# Patient Record
Sex: MALE | Race: WHITE | Employment: FULL TIME | URBAN - METROPOLITAN AREA
[De-identification: names, ages, dates, MRNs, and addresses within clinical notes are randomized per-mention and may not be internally consistent; named-entity substitution may affect disease eponyms.]

---

## 2017-02-02 ENCOUNTER — HOSPITAL ENCOUNTER (OUTPATIENT)
Dept: OTHER | Age: 62
Discharge: HOME | End: 2017-02-02
Attending: FAMILY MEDICINE | Admitting: FAMILY MEDICINE

## 2017-02-02 DIAGNOSIS — M75.82 TENDINITIS OF LEFT ROTATOR CUFF: ICD-10-CM

## 2017-02-02 DIAGNOSIS — M25.512 ACUTE PAIN OF LEFT SHOULDER: ICD-10-CM

## 2018-01-17 ENCOUNTER — OFFICE VISIT (OUTPATIENT)
Dept: CARDIOLOGY CLINIC | Age: 63
End: 2018-01-17

## 2018-01-17 ENCOUNTER — NURSE ONLY (OUTPATIENT)
Dept: CARDIOLOGY CLINIC | Age: 63
End: 2018-01-17

## 2018-01-17 VITALS
HEIGHT: 72 IN | HEART RATE: 60 BPM | SYSTOLIC BLOOD PRESSURE: 124 MMHG | WEIGHT: 227.8 LBS | DIASTOLIC BLOOD PRESSURE: 78 MMHG | OXYGEN SATURATION: 97 % | BODY MASS INDEX: 30.85 KG/M2

## 2018-01-17 DIAGNOSIS — E78.49 OTHER HYPERLIPIDEMIA: ICD-10-CM

## 2018-01-17 DIAGNOSIS — R00.2 PALPITATION: ICD-10-CM

## 2018-01-17 DIAGNOSIS — I10 ESSENTIAL HYPERTENSION: ICD-10-CM

## 2018-01-17 DIAGNOSIS — R00.2 PALPITATION: Primary | ICD-10-CM

## 2018-01-17 DIAGNOSIS — G47.33 OSA (OBSTRUCTIVE SLEEP APNEA): ICD-10-CM

## 2018-01-17 PROCEDURE — 1036F TOBACCO NON-USER: CPT | Performed by: INTERNAL MEDICINE

## 2018-01-17 PROCEDURE — G8484 FLU IMMUNIZE NO ADMIN: HCPCS | Performed by: INTERNAL MEDICINE

## 2018-01-17 PROCEDURE — G8427 DOCREV CUR MEDS BY ELIG CLIN: HCPCS | Performed by: INTERNAL MEDICINE

## 2018-01-17 PROCEDURE — 93000 ELECTROCARDIOGRAM COMPLETE: CPT | Performed by: INTERNAL MEDICINE

## 2018-01-17 PROCEDURE — 3017F COLORECTAL CA SCREEN DOC REV: CPT | Performed by: INTERNAL MEDICINE

## 2018-01-17 PROCEDURE — G8417 CALC BMI ABV UP PARAM F/U: HCPCS | Performed by: INTERNAL MEDICINE

## 2018-01-17 PROCEDURE — 99204 OFFICE O/P NEW MOD 45 MIN: CPT | Performed by: INTERNAL MEDICINE

## 2018-01-17 RX ORDER — RANITIDINE 300 MG/1
300 TABLET ORAL NIGHTLY
COMMUNITY

## 2018-01-17 NOTE — PROGRESS NOTES
[address]   Electrophysiology Consultation   Date: 1/17/2018  Reason for Consultation:   Consult Requesting Physician: Alexus Giraldo      Chief Complaint   Patient presents with    Atrial Flutter        HPI: Kamari yTler is a 57 y.o. Seen by Jessee Bryson in 2012 for CP. 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 He is a  at [employer]. He is treated for hypertension, hyperlipidemia. Previously, he was taking a \"pill\" for palpitations but did not know the name of the medication. He has felt intermittent heart fluttering for two days and was referred to EP by . He arrives in office today for evaluation of fluttering sensation of heart. He states onset was years ago and very infrequent with affect on ADLs. The sensation reoccurred two days ago and has been persistent since then, still asymptomatic. Patient denies lightheadedness, dizziness, chest pain,  orthopnea, edema, presyncope or syncope. Past Medical History:   Diagnosis Date    Hypercholesteremia     Hypertension         History reviewed. No pertinent surgical history. Allergies: Allergies   Allergen Reactions    Penicillins Rash       Social History:   reports that he quit smoking about 38 years ago. He has never used smokeless tobacco. He reports that he drinks alcohol. He reports that he does not use drugs. Family History:  family history includes Heart Disease in his father and paternal uncle; High Blood Pressure in his father and paternal uncle; High Cholesterol in his father and paternal uncle. Reviewed. Denies family history of sudden cardiac death, arrhythmia, premature CAD    Review of System:  All other systems reviewed and are negative except for that noted above.  Pertinent negatives are:   General: negative for fever, chills   Ophthalmic ROS: negative for - eye pain or loss of vision  ENT ROS: negative for - headaches, sore throat   Respiratory: negative for - cough, sputum  Cardiovascular: Reviewed in HPI  Gastrointestinal: negative for - abdominal pain, diarrhea, N/V  Hematology: negative for - bleeding, blood clots, bruising or jaundice  Genito-Urinary:  negative for - Dysuria or incontinence  Musculoskeletal: negative for - Joint swelling, muscle pain  Neurological: negative for - confusion, dizziness, headaches   Psychiatric: No anxiety, no depression. Dermatological: negative for - rash    Physical Examination:  Vitals:    18 1458   BP: 124/78   Pulse: 60   SpO2: 97%      Wt Readings from Last 3 Encounters:   18 227 lb 12.8 oz (103.3 kg)   04/10/14 220 lb (99.8 kg)   12 224 lb 8 oz (101.8 kg)       · Constitutional: Oriented. No distress. · Head: Normocephalic and atraumatic. · Mouth/Throat: Oropharynx is clear and moist.   · Eyes: Conjunctivae normal. EOM are normal.   · Neck: Neck supple. No rigidity. No JVD present. · Cardiovascular: Normal rate, regular rhythm, S1&S2. · Pulmonary/Chest: Bilateral respiratory sounds. No wheezes, No rhonchi. · Abdominal: Soft. Bowel sounds present. No distension, No tenderness. · Musculoskeletal: No tenderness. No edema    · Lymphadenopathy: Has no cervical adenopathy. · Neurological: Alert and oriented. Cranial nerve appears intact, No Gross deficit   · Skin: Skin is warm and dry. No rash noted. · Psychiatric: Has a normal behavior       Labs, diagnostic and imaging results reviewed. Reviewed. EC2018   SB 55bpm Qtc 416    LHC: 12   Normal Coronary arteriogram   Normal LV gram       Medication:  Current Outpatient Prescriptions   Medication Sig Dispense Refill    ranitidine (ZANTAC) 300 MG tablet Take 300 mg by mouth nightly      hydrocortisone (PROCTOZONE-HC) 2.5 % rectal cream Place rectally as needed for Hemorrhoids Place rectally 2 times daily.  aspirin 81 MG tablet Take 81 mg by mouth daily.         lisinopril (PRINIVIL;ZESTRIL) 10 MG tablet Take 10 mg by mouth

## 2018-02-19 PROCEDURE — 93228 REMOTE 30 DAY ECG REV/REPORT: CPT | Performed by: INTERNAL MEDICINE

## 2018-02-20 ENCOUNTER — TELEPHONE (OUTPATIENT)
Dept: CARDIOLOGY CLINIC | Age: 63
End: 2018-02-20

## 2018-03-22 ENCOUNTER — OFFICE VISIT (OUTPATIENT)
Dept: CARDIOLOGY CLINIC | Age: 63
End: 2018-03-22

## 2018-03-22 VITALS
OXYGEN SATURATION: 96 % | DIASTOLIC BLOOD PRESSURE: 80 MMHG | HEIGHT: 72 IN | WEIGHT: 225 LBS | HEART RATE: 68 BPM | BODY MASS INDEX: 30.48 KG/M2 | SYSTOLIC BLOOD PRESSURE: 128 MMHG

## 2018-03-22 DIAGNOSIS — R00.2 PALPITATION: ICD-10-CM

## 2018-03-22 DIAGNOSIS — Z92.89 HISTORY OF CARDIAC MONITORING: ICD-10-CM

## 2018-03-22 DIAGNOSIS — E78.49 OTHER HYPERLIPIDEMIA: ICD-10-CM

## 2018-03-22 DIAGNOSIS — G47.33 OSA (OBSTRUCTIVE SLEEP APNEA): ICD-10-CM

## 2018-03-22 DIAGNOSIS — I47.29 NSVT (NONSUSTAINED VENTRICULAR TACHYCARDIA): Primary | ICD-10-CM

## 2018-03-22 DIAGNOSIS — I10 ESSENTIAL HYPERTENSION: ICD-10-CM

## 2018-03-22 PROCEDURE — 1036F TOBACCO NON-USER: CPT | Performed by: INTERNAL MEDICINE

## 2018-03-22 PROCEDURE — G8417 CALC BMI ABV UP PARAM F/U: HCPCS | Performed by: INTERNAL MEDICINE

## 2018-03-22 PROCEDURE — 3017F COLORECTAL CA SCREEN DOC REV: CPT | Performed by: INTERNAL MEDICINE

## 2018-03-22 PROCEDURE — 93000 ELECTROCARDIOGRAM COMPLETE: CPT | Performed by: INTERNAL MEDICINE

## 2018-03-22 PROCEDURE — 99214 OFFICE O/P EST MOD 30 MIN: CPT | Performed by: INTERNAL MEDICINE

## 2018-03-22 PROCEDURE — G8427 DOCREV CUR MEDS BY ELIG CLIN: HCPCS | Performed by: INTERNAL MEDICINE

## 2018-03-22 PROCEDURE — G8484 FLU IMMUNIZE NO ADMIN: HCPCS | Performed by: INTERNAL MEDICINE

## 2018-03-22 NOTE — PROGRESS NOTES
[facility]   Electrophysiology Consultation   Date: 3/22/2018  Reason for Consultation:   Consult Requesting Physician: Tigist Lozada      No chief complaint on file. HPI: Karthik Tucker is a 57 y.o. Seen by Chavo Sheets in 2012 for CP. 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 He is a  at [employer]. He is treated for hypertension, hyperlipidemia. Previously, he was taking a \"pill\" for palpitations but did not know the name of the medication. He has felt intermittent heart fluttering for two days and was referred to EP by . Interval History: Today Fili Chavez presents to office for follow up and results of 2 [facility] [address]. Feeling well overall from cardiac standpoint. No episodes since monitor, Discussed another monitor if palpitations return. He had some symptoms early on his monitor but no symptoms later when 2 short NSVT were seen    Past Medical History:   Diagnosis Date    Hypercholesteremia     Hypertension         History reviewed. No pertinent surgical history. Allergies: Allergies   Allergen Reactions    Penicillins Rash       Social History:   reports that he quit smoking about 38 years ago. He has never used smokeless tobacco. He reports that he drinks alcohol. He reports that he does not use drugs. Family History:  family history includes Heart Disease in his father and paternal uncle; High Blood Pressure in his father and paternal uncle; High Cholesterol in his father and paternal uncle. Reviewed. Denies family history of sudden cardiac death, arrhythmia, premature CAD    Review of System:  All other systems reviewed and are negative except for that noted above.  Pertinent negatives are:   General: negative for fever, chills   Ophthalmic ROS: negative for - eye pain or loss of vision  ENT ROS: negative for - headaches, sore throat   Respiratory: negative for - cough, sputum  Cardiovascular: Reviewed in HPI  Gastrointestinal: negative for - abdominal pain, diarrhea, N/V  Hematology: negative for - bleeding, blood clots, bruising or jaundice  Genito-Urinary:  negative for - Dysuria or incontinence  Musculoskeletal: negative for - Joint swelling, muscle pain  Neurological: negative for - confusion, dizziness, headaches   Psychiatric: No anxiety, no depression. Dermatological: negative for - rash    Physical Examination:  Vitals:    03/22/18 0836   BP: 128/80   Pulse: 68   SpO2: 96%      Wt Readings from Last 3 Encounters:   03/22/18 225 lb (102.1 kg)   01/17/18 227 lb 12.8 oz (103.3 kg)   04/10/14 220 lb (99.8 kg)       · Constitutional: Oriented. No distress. · Head: Normocephalic and atraumatic. · Mouth/Throat: Oropharynx is clear and moist.   · Eyes: Conjunctivae normal. EOM are normal.   · Neck: Neck supple. No rigidity. No JVD present. · Cardiovascular: Normal rate, regular rhythm, S1&S2. · Pulmonary/Chest: Bilateral respiratory sounds. No wheezes, No rhonchi. · Abdominal: Soft. Bowel sounds present. No distension, No tenderness. · Musculoskeletal: No tenderness. No edema    · Lymphadenopathy: Has no cervical adenopathy. · Neurological: Alert and oriented. Cranial nerve appears intact, No Gross deficit   · Skin: Skin is warm and dry. No rash noted. · Psychiatric: Has a normal behavior       Labs, diagnostic and imaging results reviewed. Reviewed. LABS:  Cr 1.02 3/14/2018  AST 23    MCOT 1/17/18-2/15/18 personally reviewed today  SR; HR  (61). 2 short NSVT one on 1/18 18 and one on 2/11/2018 6 beats of VT at 134bpm. No symptoms reported. No AF indicated; PVC burden <1%.      ECG:  3/22/2018 personally reviewed  SR 62bpm Qtc 413    LHC: 9/14/12   Normal Coronary arteriogram   Normal LV gram       Medication:  Current Outpatient Prescriptions   Medication Sig Dispense Refill    ranitidine (ZANTAC) 300 MG tablet Take 300 mg by mouth nightly      hydrocortisone (PROCTOZONE-HC) 2.5 % rectal cream Place

## 2020-07-29 ENCOUNTER — OFFICE VISIT (OUTPATIENT)
Dept: PRIMARY CARE CLINIC | Age: 65
End: 2020-07-29

## 2020-07-29 PROCEDURE — 99211 OFF/OP EST MAY X REQ PHY/QHP: CPT | Performed by: NURSE PRACTITIONER

## 2020-07-29 NOTE — PROGRESS NOTES
Brigitte Cottrell received a viral test for COVID-19. They were educated on isolation and quarantine as appropriate. For any symptoms, they were directed to seek care from their PCP, given contact information to establish with a doctor, directed to an urgent care or the emergency room.

## 2020-07-29 NOTE — PATIENT INSTRUCTIONS

## 2020-07-30 LAB
SARS-COV-2: NOT DETECTED
SOURCE: NORMAL

## 2021-12-22 ENCOUNTER — HOSPITAL ENCOUNTER (OUTPATIENT)
Age: 66
Discharge: HOME | End: 2021-12-22
Payer: MEDICARE

## 2021-12-22 PROCEDURE — U0003 INFECTIOUS AGENT DETECTION BY NUCLEIC ACID (DNA OR RNA); SEVERE ACUTE RESPIRATORY SYNDROME CORONAVIRUS 2 (SARS-COV-2) (CORONAVIRUS DISEASE [COVID-19]), AMPLIFIED PROBE TECHNIQUE, MAKING USE OF HIGH THROUGHPUT TECHNOLOGIES AS DESCRIBED BY CMS-2020-01-R: HCPCS

## 2021-12-22 PROCEDURE — U0005 INFEC AGEN DETEC AMPLI PROBE: HCPCS

## 2021-12-23 LAB — SARS-COV-2: NOT DETECTED

## 2021-12-27 ENCOUNTER — HOSPITAL ENCOUNTER (OUTPATIENT)
Dept: ENDOSCOPY | Age: 66
Discharge: HOME | End: 2021-12-27

## 2023-05-18 RX ORDER — FAMOTIDINE 40 MG/1
1 TABLET, FILM COATED ORAL DAILY
COMMUNITY
Start: 2022-10-04

## 2023-05-18 RX ORDER — PANTOPRAZOLE SODIUM 40 MG/1
1 TABLET, DELAYED RELEASE ORAL DAILY
COMMUNITY
Start: 2022-11-11

## 2023-05-22 ENCOUNTER — ANESTHESIA EVENT (OUTPATIENT)
Dept: ENDOSCOPY | Age: 68
End: 2023-05-22
Payer: MEDICARE

## 2023-05-23 ENCOUNTER — HOSPITAL ENCOUNTER (OUTPATIENT)
Age: 68
Setting detail: OUTPATIENT SURGERY
Discharge: HOME | End: 2023-05-23
Attending: INTERNAL MEDICINE | Admitting: INTERNAL MEDICINE
Payer: MEDICARE

## 2023-05-23 ENCOUNTER — ANESTHESIA (OUTPATIENT)
Dept: ENDOSCOPY | Age: 68
End: 2023-05-23
Payer: MEDICARE

## 2023-05-23 VITALS
HEART RATE: 57 BPM | HEIGHT: 72 IN | RESPIRATION RATE: 20 BRPM | TEMPERATURE: 97 F | DIASTOLIC BLOOD PRESSURE: 78 MMHG | SYSTOLIC BLOOD PRESSURE: 108 MMHG | WEIGHT: 218.48 LBS | BODY MASS INDEX: 29.59 KG/M2 | OXYGEN SATURATION: 98 %

## 2023-05-23 PROCEDURE — 2709999900 HC NON-CHARGEABLE SUPPLY: Performed by: INTERNAL MEDICINE

## 2023-05-23 PROCEDURE — 3604323500 HC GERD TEST W/ELECTRODE (BRAVO): Performed by: INTERNAL MEDICINE

## 2023-05-23 PROCEDURE — 7100000000 HC PACU RECOVERY - FIRST 15 MIN: Performed by: INTERNAL MEDICINE

## 2023-05-23 PROCEDURE — 2720000010 HC SURG SUPPLY STERILE: Performed by: INTERNAL MEDICINE

## 2023-05-23 PROCEDURE — 7100000010 HC PHASE II RECOVERY - FIRST 15 MIN: Performed by: INTERNAL MEDICINE

## 2023-05-23 PROCEDURE — 3700000000 HC ANESTHESIA ATTENDED CARE: Performed by: INTERNAL MEDICINE

## 2023-05-23 PROCEDURE — 3700000001 HC ADD 15 MINUTES (ANESTHESIA): Performed by: INTERNAL MEDICINE

## 2023-05-23 PROCEDURE — 2500000003 HC RX 250 WO HCPCS

## 2023-05-23 PROCEDURE — 6360000002 HC RX W HCPCS

## 2023-05-23 PROCEDURE — 2580000003 HC RX 258: Performed by: ANESTHESIOLOGY

## 2023-05-23 PROCEDURE — 7100000001 HC PACU RECOVERY - ADDTL 15 MIN: Performed by: INTERNAL MEDICINE

## 2023-05-23 PROCEDURE — 3609017100 HC EGD: Performed by: INTERNAL MEDICINE

## 2023-05-23 RX ORDER — GLYCOPYRROLATE 0.2 MG/ML
INJECTION INTRAMUSCULAR; INTRAVENOUS PRN
Status: DISCONTINUED | OUTPATIENT
Start: 2023-05-23 | End: 2023-05-23 | Stop reason: SDUPTHER

## 2023-05-23 RX ORDER — SODIUM CHLORIDE 0.9 % (FLUSH) 0.9 %
5-40 SYRINGE (ML) INJECTION EVERY 12 HOURS SCHEDULED
Status: DISCONTINUED | OUTPATIENT
Start: 2023-05-23 | End: 2023-05-23 | Stop reason: HOSPADM

## 2023-05-23 RX ORDER — PROPOFOL 10 MG/ML
INJECTION, EMULSION INTRAVENOUS CONTINUOUS PRN
Status: DISCONTINUED | OUTPATIENT
Start: 2023-05-23 | End: 2023-05-23 | Stop reason: SDUPTHER

## 2023-05-23 RX ORDER — SODIUM CHLORIDE 0.9 % (FLUSH) 0.9 %
5-40 SYRINGE (ML) INJECTION PRN
Status: DISCONTINUED | OUTPATIENT
Start: 2023-05-23 | End: 2023-05-23 | Stop reason: HOSPADM

## 2023-05-23 RX ORDER — FENTANYL CITRATE 50 UG/ML
25 INJECTION, SOLUTION INTRAMUSCULAR; INTRAVENOUS EVERY 5 MIN PRN
Status: DISCONTINUED | OUTPATIENT
Start: 2023-05-23 | End: 2023-05-23 | Stop reason: HOSPADM

## 2023-05-23 RX ORDER — LABETALOL HYDROCHLORIDE 5 MG/ML
5 INJECTION, SOLUTION INTRAVENOUS
Status: DISCONTINUED | OUTPATIENT
Start: 2023-05-23 | End: 2023-05-23 | Stop reason: HOSPADM

## 2023-05-23 RX ORDER — PROPOFOL 10 MG/ML
INJECTION, EMULSION INTRAVENOUS PRN
Status: DISCONTINUED | OUTPATIENT
Start: 2023-05-23 | End: 2023-05-23 | Stop reason: SDUPTHER

## 2023-05-23 RX ORDER — SODIUM CHLORIDE 9 MG/ML
INJECTION, SOLUTION INTRAVENOUS PRN
Status: DISCONTINUED | OUTPATIENT
Start: 2023-05-23 | End: 2023-05-23 | Stop reason: HOSPADM

## 2023-05-23 RX ORDER — ONDANSETRON 2 MG/ML
4 INJECTION INTRAMUSCULAR; INTRAVENOUS
Status: DISCONTINUED | OUTPATIENT
Start: 2023-05-23 | End: 2023-05-23 | Stop reason: HOSPADM

## 2023-05-23 RX ORDER — DIPHENHYDRAMINE HYDROCHLORIDE 50 MG/ML
12.5 INJECTION INTRAMUSCULAR; INTRAVENOUS
Status: DISCONTINUED | OUTPATIENT
Start: 2023-05-23 | End: 2023-05-23 | Stop reason: HOSPADM

## 2023-05-23 RX ORDER — LIDOCAINE HYDROCHLORIDE 20 MG/ML
INJECTION, SOLUTION EPIDURAL; INFILTRATION; INTRACAUDAL; PERINEURAL PRN
Status: DISCONTINUED | OUTPATIENT
Start: 2023-05-23 | End: 2023-05-23 | Stop reason: SDUPTHER

## 2023-05-23 RX ORDER — MEPERIDINE HYDROCHLORIDE 25 MG/ML
12.5 INJECTION INTRAMUSCULAR; INTRAVENOUS; SUBCUTANEOUS EVERY 5 MIN PRN
Status: DISCONTINUED | OUTPATIENT
Start: 2023-05-23 | End: 2023-05-23 | Stop reason: HOSPADM

## 2023-05-23 RX ADMIN — LIDOCAINE HYDROCHLORIDE 100 MG: 20 INJECTION, SOLUTION EPIDURAL; INFILTRATION; INTRACAUDAL; PERINEURAL at 07:55

## 2023-05-23 RX ADMIN — PROPOFOL 180 MCG/KG/MIN: 10 INJECTION, EMULSION INTRAVENOUS at 07:55

## 2023-05-23 RX ADMIN — PROPOFOL 80 MG: 10 INJECTION, EMULSION INTRAVENOUS at 07:55

## 2023-05-23 RX ADMIN — SODIUM CHLORIDE 20 ML/HR: 9 INJECTION, SOLUTION INTRAVENOUS at 07:06

## 2023-05-23 RX ADMIN — GLYCOPYRROLATE 0.2 MG: 0.2 INJECTION INTRAMUSCULAR; INTRAVENOUS at 07:51

## 2023-05-23 ASSESSMENT — PAIN SCALES - GENERAL
PAINLEVEL_OUTOF10: 0

## 2023-05-23 ASSESSMENT — PAIN DESCRIPTION - LOCATION: LOCATION: ABDOMEN

## 2023-05-23 ASSESSMENT — LIFESTYLE VARIABLES: SMOKING_STATUS: 0

## 2023-05-23 ASSESSMENT — PAIN - FUNCTIONAL ASSESSMENT: PAIN_FUNCTIONAL_ASSESSMENT: 0-10

## 2023-05-23 ASSESSMENT — ENCOUNTER SYMPTOMS: SHORTNESS OF BREATH: 0

## 2023-05-23 NOTE — ANESTHESIA PRE PROCEDURE
Department of Anesthesiology  Preprocedure Note       Name:  Mitali Guzman   Age:  69 y.o.  :  1955                                          MRN:  [id number]         Date:  2023      Surgeon: Yanet Oh):  Abram Rice MD    Procedure: Procedure(s):  ESOPHAGOGASTRODUODENOSCOPY WITH BRAVO    Medications prior to admission:   Prior to Admission medications    Medication Sig Start Date End Date Taking? Authorizing Provider   pantoprazole (PROTONIX) 40 MG tablet Take 1 tablet by mouth daily 22  Yes Historical Provider, MD   famotidine (PEPCID) 40 MG tablet Take 1 tablet by mouth daily 10/4/22  Yes Historical Provider, MD   hydrocortisone (ANUSOL-HC) 2.5 % rectal cream Place rectally as needed for Hemorrhoids Place rectally 2 times daily. Historical Provider, MD   lisinopril (PRINIVIL;ZESTRIL) 10 MG tablet Take 1 tablet by mouth daily    Historical Provider, MD   atorvastatin (LIPITOR) 10 MG tablet Take 1 tablet by mouth daily    Historical Provider, MD   eszopiclone (LUNESTA) 1 MG TABS Take 1 tablet by mouth nightly. Historical Provider, MD       Current medications:    Current Facility-Administered Medications   Medication Dose Route Frequency Provider Last Rate Last Admin    sodium chloride flush 0.9 % injection 5-40 mL  5-40 mL IntraVENous 2 times per day Prince Yo MD        sodium chloride flush 0.9 % injection 5-40 mL  5-40 mL IntraVENous PRN Prince Yo MD        0.9 % sodium chloride infusion   IntraVENous PRN Prince Yo MD 20 mL/hr at 23 0706 20 mL/hr at 23 0706       Allergies:     Allergies   Allergen Reactions    Penicillins Rash       Problem List:    Patient Active Problem List   Diagnosis Code    Hyperlipemia E78.5    Hypertension I10    Chest pain R07.9       Past Medical History:        Diagnosis Date    GERD (gastroesophageal reflux disease) 2023    burning sensation in throat    Hypercholesteremia     Hypertension    

## 2023-05-23 NOTE — H&P
Pre-operative History and Physical    Patient: Nora Estrada  : 1955  Acct#:     Intended Procedure: EGD with Bravo    HISTORY OF PRESENT ILLNESS:  The patient is a 69 y.o. male  who presents for EGD with Bravo due to PPI refractory GERD and chronic sore throat. Patient was intolerant of impedance and manometry probe. Past Medical History:        Diagnosis Date    GERD (gastroesophageal reflux disease) 2023    burning sensation in throat    Hypercholesteremia     Hypertension     Sleep apnea     wears bipap     Past Surgical History:        Procedure Laterality Date    COLONOSCOPY      ENDOSCOPY, COLON, DIAGNOSTIC      EYE SURGERY       Medications Prior to Admission:   No current facility-administered medications on file prior to encounter. Current Outpatient Medications on File Prior to Encounter   Medication Sig Dispense Refill    pantoprazole (PROTONIX) 40 MG tablet Take 1 tablet by mouth daily      famotidine (PEPCID) 40 MG tablet Take 1 tablet by mouth daily      hydrocortisone (ANUSOL-HC) 2.5 % rectal cream Place rectally as needed for Hemorrhoids Place rectally 2 times daily. lisinopril (PRINIVIL;ZESTRIL) 10 MG tablet Take 1 tablet by mouth daily      atorvastatin (LIPITOR) 10 MG tablet Take 1 tablet by mouth daily      eszopiclone (LUNESTA) 1 MG TABS Take 1 tablet by mouth nightly. Allergies:  Penicillins    Social History:   TOBACCO:   reports that he quit smoking about 43 years ago. His smoking use included cigarettes. He smoked an average of 1 pack per day. He has never used smokeless tobacco.  ETOH:   reports current alcohol use. DRUGS:   reports no history of drug use. PHYSICAL EXAM:      Vital Signs: /88   Pulse 54   Temp 98.3 °F (36.8 °C) (Oral)   Resp 16   Ht 6' (1.829 m)   Wt 218 lb 7.6 oz (99.1 kg)   SpO2 95%   BMI 29.63 kg/m²    Airway: No stridor or wheezing noted. Good air movement  Pulmonary: without wheezes.   Clear to

## 2023-05-23 NOTE — DISCHARGE INSTRUCTIONS
Endoscopy Discharge Instructions    Recommendations:   - Continue to hold PPI and Pepcid during 48-hour Bravo study.  - Resume regular medications otherwise. - Resume diet as tolerated. - Further recommendations pending completion of Bravo study. Call the physician that did your procedure for any questions or concerns. You may be drowsy or lightheaded after receiving sedation. DO NOT operate  a vehicle (automobile, bicycle, motorcycle, machinery, or power tools), no  alcoholic beverages, and do not make any important decisions today. Plan on bed rest or quiet relaxation today. Resume normal activities in the morning. Resume normal activity tomorrow unless otherwise advised by your physician. Eat a light first meal, avoiding spicy and fatty foods, then resume normal diet unless  you are told otherwise by your physician. If the intravenous medication site is painful, apply warm compresses on the site until the soreness is relieved and elevate the arm above the heart. Call your physician if no improvement  in 2-3 days. POSSIBLE SYMPTOMS TO WATCH:     1. fever (greater than 100) 5. increased abdominal bloating   2. severe pain   6. excessive bleeding   3. nausea and vomiting  7. chest pain   4. chills    8. shortness of breath       Notify your physician if these problems occur     Expected as normal and remedies:  Sore throat: use over the counter throat lozenges or gargle with warm salt water. Redness or soreness at the IV site: apply warm compress  Gaseous discomfort: belching or passing flatus (gas).                     Call for biopsy results in :____7 days____________________                                Call for follow-up appointment on:[phone]______________                  Educational information given on:_____11/3/2020__________________                       Please review these discharge instructions this evening or tomorrow for

## 2023-05-23 NOTE — PROGRESS NOTES
Pt now awake and talking, oriented to pacu. Pt groggy, denies pain at this time, updated on plan of care.

## 2023-05-23 NOTE — PROGRESS NOTES
Pt sitting up in bed, still denies pain at this time, states is ready to see his wife. VSS. No signs of distress noted at this time.

## 2023-05-23 NOTE — PROGRESS NOTES
NAME:  Nora Estrada  YOB: 1955  MEDICAL RECORD NUMBER:  [id number]  DATE:  5/23/2023    Bravo Reflux Testing System    ReasonFor Study:  Gastroesophageal reflux disease, unspecified whether esophagitis present [K21.9]    Is the patient to be tested:  OFF MEDS     If testing on meds, list PPI and H2 medications (and dose): Patient to hold Pepcid and Pantoprazole__________ __________________________________________________________________    Recorder and Diary Instructions        Patient verbalizes understanding of the diary and recorder instructions:  Yes    If Recorder buttons were reassigned, please list reassigned symptoms:  Button one__________     Josias Must Two ____________    Josias Must Three _____________        Bravo Capsule Placement    LES located at ___46_cm. Capsule placed at ___40___ cm (6 cm above LES proximal border)  Post-procedure pH value: __5.9____   Recording Verified: Yes    Post Procedure   Patient and family/friend verbalized understanding of the Bravo reflux testing system instructions, emergency contact information and recorder return procedure: Yes    Additional Comments: _____Bravo instructions given to patient and to patient's wife, Bob Pereira, in Phase 2 at [place] [address]. Both verbalized understanding. Instructions and Bravo diary placed in patients chart.  QR code given to patient's wife.______________________________________  _____________________________________________________________  _____________________________________________________________  _____________________________________________________________

## 2023-05-23 NOTE — PROGRESS NOTES
Alert and oriented. Agreeable to procedure. Consent signed by pt.   Electronically signed by Maria Eugenia Frey RN on 5/23/2023 at 7:01 AM

## 2023-05-23 NOTE — PROGRESS NOTES
Problem: RESPIRATORY - ADULT  Goal: Achieves optimal ventilation and oxygenation  INTERVENTIONS:  - Assess for changes in respiratory status  - Assess for changes in mentation and behavior  - Position to facilitate oxygenation and minimize respiratory effort  - Oxygen administration by appropriate delivery method based on oxygen saturation (per order) or ABGs  - Initiate smoking cessation education as indicated  - Encourage broncho-pulmonary hygiene including cough, deep breathe, Incentive Spirometry  - Assess the need for suctioning and aspirate as needed  - Assess and instruct to report SOB or any respiratory difficulty  - Respiratory Therapy support as indicated   Outcome: Progressing Pt to pacu from ENDO. Pt asleep at arrival, snoring. On RA. Placed on monitor, VSS. No signs of distress noted at this time. Report obtained.

## 2023-05-23 NOTE — ANESTHESIA POSTPROCEDURE EVALUATION
Department of Anesthesiology  Postprocedure Note    Patient: Kaiden Patel  MRN: [id number]  YOB: 1955  Date of evaluation: 5/23/2023      Procedure Summary     Date: 05/23/23 Room / Location: [address] [place] [address]    Anesthesia Start: 7601 Anesthesia Stop: 4338    Procedure: ESOPHAGOGASTRODUODENOSCOPY WITH BRAVO Diagnosis:       Gastroesophageal reflux disease, unspecified whether esophagitis present      (GASTROESOPHAGEAL REFLUX DISEASE)    Surgeons: Edgardo Reeves MD Responsible Provider: Beryle Confer, MD    Anesthesia Type: general ASA Status: 3          Anesthesia Type: No value filed.     Terri Phase I: Terri Score: 10    Terri Phase II: Terri Score: 10      Anesthesia Post Evaluation    Patient location during evaluation: PACU  Patient participation: complete - patient participated  Level of consciousness: awake  Airway patency: patent  Nausea & Vomiting: no nausea  Complications: no  Cardiovascular status: hemodynamically stable  Respiratory status: acceptable  Hydration status: euvolemic  Multimodal analgesia pain management approach

## 2023-05-23 NOTE — OP NOTE
Endoscopy Note    Patient: Prema Gray  : 1955  Acct#:     Procedure: Esophagogastroduodenoscopy with Bravo placement                         Date:  2023     Surgeon:   Faiza Adams MD    Referring Physician:  Edel Lo MD    Indications: This is a 69 y.o. male  who presents for EGD with Bravo due to PPI refractory GERD and chronic sore throat. Patient was intolerant of impedance and manometry probe. Postoperative Diagnosis:    Normal EGD. Successful placement of Bravo capsule 40 cm from the incisors. Anesthesia:  MAC    Consent:  The patient or their legal guardian has signed an informed consent, and is aware of the potential risks, benefits, alternatives, and potential complications of this procedure. These include, but are not limited to hemorrhage, bleeding, post procedural pain, perforation, phlebitis, aspiration, hypotension, hypoxia, cardiovascular events such as arryhthmia, and possibly death. Description of Procedure: The patient was then taken to the endoscopy suite, placed in the left lateral decubitus position and the above IV sedation was administrered. The Olympus video endoscope was placed through the patient's oropharynx without difficulty to the extent of the 2nd portion of the duodenum. Both forward and retroflexed views of the stomach were obtained. Findings:    Esophagus: The esophagus appeared normal without evidence of Lama's esophagus or reflux esophagitis. Z-line was normal located 46 and meters from the incisors. The Bravo capsule was successfully placed 40 cm from the incisors and placement was confirmed endoscopically  Stomach: The stomach appeared normal on forward and retroflexed views  Duodenum: The first and 2nd portions of the duodenum appeared normal with normal villous pattern. There was subtle luminal fullness in the periampullary duodenum without obvious adenoma or irregularity.     The scope was then withdrawn back into

## 2024-12-22 ENCOUNTER — OFFICE VISIT (OUTPATIENT)
Age: 69
End: 2024-12-22

## 2024-12-22 VITALS
OXYGEN SATURATION: 95 % | HEIGHT: 72 IN | WEIGHT: 218.47 LBS | DIASTOLIC BLOOD PRESSURE: 79 MMHG | SYSTOLIC BLOOD PRESSURE: 148 MMHG | HEART RATE: 68 BPM | BODY MASS INDEX: 29.59 KG/M2 | TEMPERATURE: 98.9 F

## 2024-12-22 DIAGNOSIS — J40 BRONCHITIS: ICD-10-CM

## 2024-12-22 DIAGNOSIS — J22 LOWER RESPIRATORY TRACT INFECTION: Primary | ICD-10-CM

## 2024-12-22 RX ORDER — METHOCARBAMOL 750 MG/1
TABLET, FILM COATED ORAL
COMMUNITY
Start: 2024-11-11

## 2024-12-22 RX ORDER — SODIUM PHOSPHATE,MONO-DIBASIC 19G-7G/118
1000 ENEMA (ML) RECTAL DAILY
COMMUNITY

## 2024-12-22 RX ORDER — DEXTROMETHORPHAN HYDROBROMIDE AND PROMETHAZINE HYDROCHLORIDE 15; 6.25 MG/5ML; MG/5ML
5 SYRUP ORAL 4 TIMES DAILY PRN
Qty: 118 ML | Refills: 0 | Status: SHIPPED | OUTPATIENT
Start: 2024-12-22 | End: 2024-12-29

## 2024-12-22 RX ORDER — METHYLPREDNISOLONE 4 MG/1
TABLET ORAL
Qty: 1 KIT | Refills: 0 | Status: SHIPPED | OUTPATIENT
Start: 2024-12-22 | End: 2024-12-28

## 2024-12-22 RX ORDER — LANOLIN ALCOHOL/MO/W.PET/CERES
1000 CREAM (GRAM) TOPICAL DAILY
COMMUNITY

## 2024-12-22 RX ORDER — ASPIRIN 81 MG/1
1 TABLET ORAL DAILY
COMMUNITY
Start: 2024-09-16

## 2024-12-22 RX ORDER — DOXYCYCLINE HYCLATE 100 MG
100 TABLET ORAL 2 TIMES DAILY
Qty: 14 TABLET | Refills: 0 | Status: SHIPPED | OUTPATIENT
Start: 2024-12-22 | End: 2024-12-29

## (undated) DEVICE — ENDOSCOPY KIT: Brand: MEDLINE INDUSTRIES, INC.

## (undated) DEVICE — BITE BLOCK ENDOSCP AD 60 FR W/ ADJ STRP PLAS GRN BLOX

## (undated) DEVICE — BRAVO CF CAPSULE  DELIVERY DEV, 5-PK: Brand: BRAVO